# Patient Record
Sex: FEMALE | Race: WHITE | NOT HISPANIC OR LATINO | URBAN - METROPOLITAN AREA
[De-identification: names, ages, dates, MRNs, and addresses within clinical notes are randomized per-mention and may not be internally consistent; named-entity substitution may affect disease eponyms.]

---

## 2022-07-27 ENCOUNTER — EMERGENCY (EMERGENCY)
Facility: HOSPITAL | Age: 52
LOS: 1 days | Discharge: HOME | End: 2022-07-27
Attending: STUDENT IN AN ORGANIZED HEALTH CARE EDUCATION/TRAINING PROGRAM
Payer: COMMERCIAL

## 2022-07-27 VITALS
WEIGHT: 128.97 LBS | TEMPERATURE: 98 F | SYSTOLIC BLOOD PRESSURE: 134 MMHG | HEART RATE: 109 BPM | OXYGEN SATURATION: 98 % | HEIGHT: 61 IN | DIASTOLIC BLOOD PRESSURE: 86 MMHG | RESPIRATION RATE: 18 BRPM

## 2022-07-27 VITALS
OXYGEN SATURATION: 99 % | RESPIRATION RATE: 18 BRPM | HEART RATE: 97 BPM | SYSTOLIC BLOOD PRESSURE: 129 MMHG | TEMPERATURE: 98 F | DIASTOLIC BLOOD PRESSURE: 80 MMHG

## 2022-07-27 DIAGNOSIS — N93.9 ABNORMAL UTERINE AND VAGINAL BLEEDING, UNSPECIFIED: ICD-10-CM

## 2022-07-27 LAB
ALBUMIN SERPL ELPH-MCNC: 3.1 G/DL — LOW (ref 3.5–5)
ALP SERPL-CCNC: 31 U/L — LOW (ref 40–120)
ALT FLD-CCNC: 16 U/L DA — SIGNIFICANT CHANGE UP (ref 10–60)
ANION GAP SERPL CALC-SCNC: 4 MMOL/L — LOW (ref 5–17)
APTT BLD: 23.1 SEC — LOW (ref 27.5–35.5)
AST SERPL-CCNC: 14 U/L — SIGNIFICANT CHANGE UP (ref 10–40)
BASOPHILS # BLD AUTO: 0.06 K/UL — SIGNIFICANT CHANGE UP (ref 0–0.2)
BASOPHILS NFR BLD AUTO: 0.6 % — SIGNIFICANT CHANGE UP (ref 0–2)
BILIRUB SERPL-MCNC: 0.3 MG/DL — SIGNIFICANT CHANGE UP (ref 0.2–1.2)
BLD GP AB SCN SERPL QL: SIGNIFICANT CHANGE UP
BUN SERPL-MCNC: 19 MG/DL — HIGH (ref 7–18)
CALCIUM SERPL-MCNC: 7.9 MG/DL — LOW (ref 8.4–10.5)
CHLORIDE SERPL-SCNC: 109 MMOL/L — HIGH (ref 96–108)
CO2 SERPL-SCNC: 27 MMOL/L — SIGNIFICANT CHANGE UP (ref 22–31)
CREAT SERPL-MCNC: 0.71 MG/DL — SIGNIFICANT CHANGE UP (ref 0.5–1.3)
EGFR: 103 ML/MIN/1.73M2 — SIGNIFICANT CHANGE UP
EOSINOPHIL # BLD AUTO: 0.06 K/UL — SIGNIFICANT CHANGE UP (ref 0–0.5)
EOSINOPHIL NFR BLD AUTO: 0.6 % — SIGNIFICANT CHANGE UP (ref 0–6)
GLUCOSE SERPL-MCNC: 107 MG/DL — HIGH (ref 70–99)
HCT VFR BLD CALC: 29.7 % — LOW (ref 34.5–45)
HGB BLD-MCNC: 9.9 G/DL — LOW (ref 11.5–15.5)
IMM GRANULOCYTES NFR BLD AUTO: 0.5 % — SIGNIFICANT CHANGE UP (ref 0–1.5)
INR BLD: 1.12 RATIO — SIGNIFICANT CHANGE UP (ref 0.88–1.16)
LYMPHOCYTES # BLD AUTO: 1.21 K/UL — SIGNIFICANT CHANGE UP (ref 1–3.3)
LYMPHOCYTES # BLD AUTO: 13 % — SIGNIFICANT CHANGE UP (ref 13–44)
MCHC RBC-ENTMCNC: 29.1 PG — SIGNIFICANT CHANGE UP (ref 27–34)
MCHC RBC-ENTMCNC: 33.3 GM/DL — SIGNIFICANT CHANGE UP (ref 32–36)
MCV RBC AUTO: 87.4 FL — SIGNIFICANT CHANGE UP (ref 80–100)
MONOCYTES # BLD AUTO: 0.47 K/UL — SIGNIFICANT CHANGE UP (ref 0–0.9)
MONOCYTES NFR BLD AUTO: 5 % — SIGNIFICANT CHANGE UP (ref 2–14)
NEUTROPHILS # BLD AUTO: 7.47 K/UL — HIGH (ref 1.8–7.4)
NEUTROPHILS NFR BLD AUTO: 80.3 % — HIGH (ref 43–77)
NRBC # BLD: 0 /100 WBCS — SIGNIFICANT CHANGE UP (ref 0–0)
PLATELET # BLD AUTO: 161 K/UL — SIGNIFICANT CHANGE UP (ref 150–400)
POTASSIUM SERPL-MCNC: 3.9 MMOL/L — SIGNIFICANT CHANGE UP (ref 3.5–5.3)
POTASSIUM SERPL-SCNC: 3.9 MMOL/L — SIGNIFICANT CHANGE UP (ref 3.5–5.3)
PROT SERPL-MCNC: 6.3 G/DL — SIGNIFICANT CHANGE UP (ref 6–8.3)
PROTHROM AB SERPL-ACNC: 13.3 SEC — SIGNIFICANT CHANGE UP (ref 10.5–13.4)
RBC # BLD: 3.4 M/UL — LOW (ref 3.8–5.2)
RBC # FLD: 14.3 % — SIGNIFICANT CHANGE UP (ref 10.3–14.5)
SODIUM SERPL-SCNC: 140 MMOL/L — SIGNIFICANT CHANGE UP (ref 135–145)
WBC # BLD: 9.32 K/UL — SIGNIFICANT CHANGE UP (ref 3.8–10.5)
WBC # FLD AUTO: 9.32 K/UL — SIGNIFICANT CHANGE UP (ref 3.8–10.5)

## 2022-07-27 PROCEDURE — 76830 TRANSVAGINAL US NON-OB: CPT | Mod: 26

## 2022-07-27 PROCEDURE — 76856 US EXAM PELVIC COMPLETE: CPT | Mod: 26

## 2022-07-27 PROCEDURE — 36415 COLL VENOUS BLD VENIPUNCTURE: CPT

## 2022-07-27 PROCEDURE — 85610 PROTHROMBIN TIME: CPT

## 2022-07-27 PROCEDURE — 86901 BLOOD TYPING SEROLOGIC RH(D): CPT

## 2022-07-27 PROCEDURE — 85730 THROMBOPLASTIN TIME PARTIAL: CPT

## 2022-07-27 PROCEDURE — 99284 EMERGENCY DEPT VISIT MOD MDM: CPT | Mod: 25

## 2022-07-27 PROCEDURE — 86850 RBC ANTIBODY SCREEN: CPT

## 2022-07-27 PROCEDURE — 85025 COMPLETE CBC W/AUTO DIFF WBC: CPT

## 2022-07-27 PROCEDURE — 80053 COMPREHEN METABOLIC PANEL: CPT

## 2022-07-27 PROCEDURE — 76830 TRANSVAGINAL US NON-OB: CPT

## 2022-07-27 PROCEDURE — 76856 US EXAM PELVIC COMPLETE: CPT

## 2022-07-27 PROCEDURE — 99285 EMERGENCY DEPT VISIT HI MDM: CPT

## 2022-07-27 PROCEDURE — 86900 BLOOD TYPING SEROLOGIC ABO: CPT

## 2022-07-27 NOTE — ED PROVIDER NOTE - NSFOLLOWUPINSTRUCTIONS_ED_ALL_ED_FT
You were seen in the emergency department for vaginal bleeding.     Please follow-up with your gynecologist in the next [id number] hours.     If you have any worsening symptoms, severe headache, chest pain, shortness of breath, please return to the emergency department.

## 2022-07-27 NOTE — ED PROVIDER NOTE - PATIENT PORTAL LINK FT
You can access the FollowMyHealth Patient Portal offered by [facility] by registering at the following website: [email or web address]. By joining [email or web address]’s FollowMyHealth portal, you will also be able to view your health information using other applications (apps) compatible with our system.

## 2022-07-27 NOTE — CONSULT NOTE ADULT - ASSESSMENT
a/p: 51yo  with h/o breast cancer, perimenopausal vaginal bleeding, hemodynamically stable  - Patient is instructed to follow up with gynecologist, Dr. Morgan this week.   - Patient is instructed to return to the ED if heavy vaginal bleeding, dizziness. Patient is instructed to stay hydrated, eat iron rich foods.     -d/w Dr Barrera

## 2022-07-27 NOTE — CONSULT NOTE ADULT - SUBJECTIVE AND OBJECTIVE BOX
HPI: 51yo  with h/o breast cancer, endometrial polypectomy in 2021, LMP Dec 2021, presents with vb since Monday, patient reports using 2-3 sanitary pads per day, Patient denies vaginal discharge/passage of clots. Patient denies pelvic pain, abd pain, cp, sob, dizziness, palpitations, n/v/d/c, fever; chills.  OP GYN Dr. Recio, [facility], last seen in 2021 s/p polpectomy  pob/gynhx:  ; denies h/o std's; no abn pap smear; no fibroids; preimenopausal  pmhx: breast cancer 6 years prior, currently taking tamoxifen  pshx: lumpectomy , uncomplicated, D&C x4, most recent Oct 2021 at [facility]  all: nkda  meds: tamoxifen 20 daily  sochx: no etoh/drug/tobacco use    REVIEW OF SYSTEMS: see HPI	    PE:  Vital Signs Last 24 Hrs  T(C): 36.8 (2022 19:55), Max: 36.8 (2022 16:58)  T(F): 98.3 (2022 19:55), Max: 98.3 (2022 19:55)  HR: 97 (2022 19:55) (97 - 109)  BP: 129/80 (2022 19:55) (129/80 - 134/86)  RR: 18 (2022 19:55) (18 - 18)  SpO2: 99% (2022 19:55) (98% - 99%)    Parameters below as of 2022 16:58  Patient On (Oxygen Delivery Method): room air    abd: +bs; soft, nt, nd, no rebound or guarding; no cvat b/l  pelvis:  scant dark blood in vaginal canal, c/l/p, no masses appreciated b/l    LABS:                        9.9    9.32  )-----------( 161      ( 2022 19:05 )             29.7     07-27    140  |  109<H>  |  19<H>  ----------------------------<  107<H>  3.9   |  27  |  0.71    Ca    7.9<L>      2022 19:05    TPro  6.3  /  Alb  3.1<L>  /  TBili  0.3  /  DBili  x   /  AST  14  /  ALT  16  /  AlkPhos  31<L>      PT/INR - ( 2022 19:05 )   PT: 13.3 sec;   INR: 1.12 ratio         PTT - ( 2022 19:05 )  PTT:23.1 sec      RADIOLOGY & ADDITIONAL STUDIES:  < from: US Pelvis Complete (US Pelvis Complete .) (22 @ 20:38) >  ACC: [id number] EXAM:  US PELVIC COMPLETE                        ACC: [id number] EXAM:  US TRANSVAGINAL                          PROCEDURE DATE:  2022          INTERPRETATION:  CLINICAL INFORMATION: vaginal bleeding HUS    COMPARISON: None available.    TECHNIQUE:  Endovaginal and transabdominal pelvic sonogram.    FINDINGS:  Uterus: 13.7 cm x 7.3 cm x 10.9 cm. Within normal limits.  Endometrium: 24 mm. Prominent and heterogeneous.    Right ovary: 2.8 cm x 1.8 cm x 2.5 cm. Within normal limits.  Left ovary: 3.4 cm x 2.5 cm x 3.2 cm. Small cysts measuring up to 1.6 cm.    Fluid: None.    IMPRESSION:  Thickened and heterogeneous endometrium; recommend GYN evaluation and MRI.    --- End of Report ---            KWAKU GOYAL MD; Attending Radiologist  This document has been electronically signed. 2022  8:53PM    < end of copied text >      a/p: 51yo  with h/o breast cancer, perimenopausal vaginal bleeding, hemodynamically stable  - Patient is instructed to follow up with gynecologist, Dr. Morgan this week.   - Patient is instructed to return to the ED if heavy vaginal bleeding, dizziness. Patient is instructed to stay hydrated, eat iron rich foods.     -d/w Dr Barrera

## 2022-07-27 NOTE — ED PROVIDER NOTE - CLINICAL SUMMARY MEDICAL DECISION MAKING FREE TEXT BOX
52F presenting with vaginal bleeding. 13 hemoglobin one month ago. patient defers vaginal exam for gyn. will get labs, US, gyn consult. will reassess.

## 2022-07-27 NOTE — ED ADULT NURSE NOTE - OBJECTIVE STATEMENT
Pt with a Hx of Lt breast CA and lumpectomy 7 yrs ago, on Tamoxifen, c/o heavy vaginal bleeding x 3 days , feeling weak

## 2022-07-27 NOTE — ED PROVIDER NOTE - OBJECTIVE STATEMENT
52F, pmh of htn, breast cancer (on Tamoxifen), presenting with weakness and vaginal bleeding. patient has had vaginal bleeding for the past two days. today she felt very weak and dizzy and her daughter felt she looked pale. has had to be admitted to the hospital for anemia due to vaginal bleeding in the past, but has not had a blood transfusion. no headache, chest pain or shortness of breath.

## 2023-12-07 NOTE — CONSULT NOTE ADULT - PROBLEM SELECTOR RECOMMENDATION 9
a/p: 53yo  with h/o breast cancer, perimenopausal vaginal bleeding, hemodynamically stable  - Patient is instructed to follow up with gynecologist, Dr. Morgan this week.   - Patient is instructed to return to the ED if heavy vaginal bleeding, dizziness. Patient is instructed to stay hydrated, eat iron rich foods.     -d/w Dr Barrera
- - -